# Patient Record
Sex: FEMALE | Race: WHITE
[De-identification: names, ages, dates, MRNs, and addresses within clinical notes are randomized per-mention and may not be internally consistent; named-entity substitution may affect disease eponyms.]

---

## 2019-01-08 ENCOUNTER — HOSPITAL ENCOUNTER (INPATIENT)
Dept: HOSPITAL 46 - ED | Age: 64
LOS: 2 days | Discharge: NURSING, REHAB OR LONG-TERM CARE | DRG: 291 | End: 2019-01-10
Attending: INTERNAL MEDICINE | Admitting: INTERNAL MEDICINE
Payer: MEDICARE

## 2019-01-08 VITALS — BODY MASS INDEX: 57.52 KG/M2 | WEIGHT: 293 LBS | HEIGHT: 60 IN

## 2019-01-08 DIAGNOSIS — K21.9: ICD-10-CM

## 2019-01-08 DIAGNOSIS — J96.22: ICD-10-CM

## 2019-01-08 DIAGNOSIS — Z66: ICD-10-CM

## 2019-01-08 DIAGNOSIS — E11.9: ICD-10-CM

## 2019-01-08 DIAGNOSIS — J44.9: ICD-10-CM

## 2019-01-08 DIAGNOSIS — I11.0: Primary | ICD-10-CM

## 2019-01-08 DIAGNOSIS — E78.5: ICD-10-CM

## 2019-01-08 DIAGNOSIS — F39: ICD-10-CM

## 2019-01-08 DIAGNOSIS — K59.00: ICD-10-CM

## 2019-01-08 DIAGNOSIS — E66.01: ICD-10-CM

## 2019-01-08 DIAGNOSIS — I50.33: ICD-10-CM

## 2019-01-08 DIAGNOSIS — J96.21: ICD-10-CM

## 2019-01-08 DIAGNOSIS — Z79.4: ICD-10-CM

## 2019-01-08 NOTE — NUR
IS SLEEPING SOUNDLY, NOTED SATS DECREASED TO [address]. WATCHED
PT SLEEP AND TV WILL BE 'S THEN PT WILL TAKE DEEP BREATH AND TV WILL INC
'S AND SATS INC. RT CALLED.

## 2019-01-08 NOTE — NUR
AWAKE, STATES CHEST WAS HURTING AND TAKING BIPAP OFF. CHEST DISCOMFORT
RESOVLED AFTER BIPAP OFF. FELL BACK TO SLEEP BEFORE STAFF LEFT ROOM. IS OFF
BIPAP FOR NOW.

## 2019-01-08 NOTE — NUR
63 year old female pateint ADMITTED TO CCU FOR ED UNDER DR. HAIDER WITH DX OF
END STAGE COPD/CHF/RESP FAILURE. UPON ADMIT PT IS AWARE OF PERSON, PLACE AND
TIME. WILL DRIFT OFF TO SLEEP EASILY THEN WAKE. IS ABLE TO ANSWER MOST
QUESTIONS. ADMISSION PROCESS STATED.

## 2019-01-08 NOTE — NUR
PT HAD MED FIRM FORMED STOOL ON BED PAN. CHAO WELL. RESP ARE UNLABORED. PT IS
ALERT AND INTERACTIVE.

## 2019-01-08 NOTE — NUR
WAS ON BEDPAN, NO BM BUT IS PASSING GAS. HAS BEEN ON BIPAP NOW FOR ABOUT
20MIN. PT TOLD SHE NEEDS TO WEAR IT AS MUCH AS POSSABLE TONIGHT.

## 2019-01-09 NOTE — NUR
PT UP TO CHAIR WITH ASUNCION AND MULTIPLE STAFF MEMBERS. PT TOELRATED WELL. WILL
CONTINUE TO CLOSELY MONITOR. PT BED CHANGED TO BARIATIC BED. LUNCH ORDERED. NO
OTHER ISSUES AT THIS TIME. WILL CONTINUE TO CLOSELY MONITOR.

## 2019-01-09 NOTE — NUR
ASSESSMENT DONE. MEDICATION GIVEN (SEE MAR). pt DENIES SOB AT THIS TIME. ON 4
L O2 VIA NC. LAYING IN BED WATCHING TV. CALL LIGHT WITHIN REACH. HIV/blood type/group B strep/rubella/VDRL/RPR/antibody screen/HBsAG

## 2019-01-09 NOTE — NUR
STATES NOT SLEEPING AND "I USUALLY TAKE A SLEEPING PILL." EXPLAINED WOULD NOT
BE GIVEN ANYTHING TO SLEEP AS POSSIBLITY OF RESP DEPRESSION. PT VOLUNTERED TO
GO BACK ON BIPAP AS "I MIGHT GO BACK TO SLEEP" BIPAP IN PLACE.

## 2019-01-09 NOTE — NUR
PT HAS HAD MULTIPLE LARGE BMS TODAY. PT IS BACK IN BED AT THIS TIME. PT DENIES
ANY NEEDS. CALL LIGHT IN REACH. TV ON. PT IS LOOKING AT THE MENU FOR DINNER.
WILL CONTINUE TO CLSOELY MONITOR.

## 2019-01-09 NOTE — NUR
PT REPOSITIONED IN THE CHAIR WITH ASUNCION LIFT AND MULTIPLE STAFF MEMBERS. PT
TOLERATED WELL. WILL CONTINUE TO CLOSELY MONITOR.

## 2019-01-09 NOTE — NUR
PT SHIFT REPORT RECEIVED FROM NIGHT SHIFT RN. PT RESTING IN BED AT THIS TIME.
WILL CONTINUE TO CLOSELY MONITOR.

## 2019-01-09 NOTE — NUR
PATIENT IN BED WATCHING TV. VITAL SIGNS AND I&O DONE. CALL LIGHT WITHIN REACH.
NO OTHER NEEDS AT THIS TIME

## 2019-01-09 NOTE — NUR
PT BACK TO BED WITH ASUNCION AND MULTIPLE STAFF MEMBERS. ONCE IN BED PT
REQUESTING BEDPAN. BED PLAN PLACED. WILL CONTINUE TO CLOSELY MONITOR. PT CALLS
APPROPRIATELY.

## 2019-01-09 NOTE — NUR
REPORT GIVEN TO [facility] EYAD AGUILERA. UPDATED REGUARDING PTS STATUS. PT REMAINS
ON 4L NC AT THIS ITME. PT IS ALERT AND ORIENTED AND HAS BEEN UP TO THE CHAIR
TODAY AND TOLERATED WELL. PARRA IS DRAINING CLEAR YELLOW URINE. PT BREATH
SOUNDS ARE CLEAR AND DIMINISHED AT THIS TIME. PT DINNER ORDERED. MEDICATIONS
ADMINISTERED. PT DENIES ANY NEEDS BEFORE TRANSFER. EYAD AGUILERA WILL RESUME
CARE. PT IS AGREEABLE TO THIS PLAN OF CARE.

## 2019-01-09 NOTE — NUR
ROUNDED AS CHARGE. ASSISTED DIOGO LUND WITH ASSISTING PATIENT OFF OF BED PAN.
PATIENT WAS ABLE TO HAVE A BM. ZENA CARE COMPLETED. ASHELY PROVIDED PT WITH
WARM BLANKET. NO FURTHER NEEDS CALL LIGHT IN REACH.

## 2019-01-09 NOTE — NUR
PT ON THE BEDPAN SEVERAL TIMES THIS AM. PT STATES SHE FEELS LIKE SHE HAS TO
HAVE A BM, BUT IT WONT COME OUT. MD IS AWARE. WILL TRY AND GET PT UP TO THE
CAMMODE. PT IS UNABLE TO MOVE SO WILL HAVE TO ASUNCION PATIENT TO CAMMODE.

## 2019-01-09 NOTE — NUR
MEDICATED WITH TYLENOL 650MG PO C/O H/A AND R HIP PAIN. COOPERATIVE WITH
ASSESSMENT. O2 4LNC, CHRONIC 2-4L AT HOME. NO C/O SOB, BIPAP AT BEDSIDE AT
THIS TIME, PT HAS A NEWLY DX SLEEP APNEA. IN BARIATRIC BED. PT IS A ASUNCION LIFT
AND DAILY WEIGHT LAST WEIGHT 319#. PT STATES SHE USES BSC. LEGS WITH OLD
STASIS ULCERS IN DIFFERENT STAGES OF HEALING. HAS AN IV FIELD START TO BE
CHANGED. PT AWARE. TOLERATING FLUIDS AND DIET WELL.

## 2019-01-09 NOTE — NUR
pt ARRIVED FROM CCU. ON 4 L VIA NC CHRONICLY. DAILY WEIGHT. BLOOD SUGAR CHECKS
ACHS. MULTIPLE LARGE BMS. PARRA CATHETER. IV SL. TOLERATING ADA DIET. SLEEP
APNEA. HAS NOT USED CALL LIGHT.

## 2019-01-10 NOTE — NUR
PATIENT CALLED AND REQUEST TO BE TAKEN OF BIPAP. ENCOURAGED AND EDUCATED
PATIENT ON THE IMPORTANCE OF WEARING IT. PATIENT VERBALIZES UNDERSTANDING AND
STILL WISHES TO BE TAKEN OFF OF IT. NO FURTHER NEEDS NOTED. CALL LIGHT IN
REACH.

## 2019-01-10 NOTE — NUR
REPORT RECEIVED THIS AM FROM KATHRINE CASTANO, PATIENT IS SITTING UP IN A BARIATRIC
BED WITH RAILS UP AND CALL LIGHT WITHIN REACH, BED IS IN THE LOW POSITION.
SHE HAS NO QUESTIONS OR CONCERNS AT THIS TIME AND IS ALERT AND ORIENTED.
PATIENT PLACED ON A BED PAN BY CNA'S X2.

## 2019-01-10 NOTE — NUR
PT HAS SLEPT THIS SHIFT. CURRENTLY AWAKE, WATCHING TV. NO C/O PAIN OR SOB. O2
4LNC, USED BIPAP FOR SEVERAL HOURS THIS SHIFT. PT IN BARIATRIC BED. DAILY
WEIGHT 333.1#. PT IS A ASUNCION LIFT. USED BEDPAN AND HAD A LARGE SOFT BM THIS
SHIFT. F/C PATENT. PT HELPS WITH TURNING. NO N/V

## 2019-01-10 NOTE — NUR
DOCTOR MELIZA IN TO SEE THE PATIENT AT THIS TIME, PATIENT PARRA REMOVED AT THIS
TIME, SHE WAS MOVED UP IN BED AND PO K GIVEN NOW.

## 2019-01-10 NOTE — NUR
patient remains at rest in bed with cover over her eyes, blood sugar check at
this time 93 no insulin coverage needed.

## 2019-01-10 NOTE — EKG
[facility]
                                    [address]
                                  [place]  [address]
_________________________________________________________________________________________
                                                                 Signed   
 
 
Normal sinus rhythm
Low voltage QRS
Borderline ECG
When compared with ECG of 20-NOV-2018 20:47,
No significant change was found
Confirmed by SHIRA HAIDER MD ([id number]) on 1/10/2019 12:51:51 AM
 
 
 
 
 
 
 
 
 
 
 
 
 
 
 
 
 
 
 
 
 
 
 
 
 
 
 
 
 
 
 
 
 
 
 
 
 
 
 
    Electronically Signed By: SHIRA HAIDER MD  01/10/19 0052
_________________________________________________________________________________________
PATIENT NAME:     RAHUL CORDOBA                     
MEDICAL RECORD #: [id number]                     Electrocardiogram             
          ACCT #: [id number]  
DATE OF BIRTH:   [birth year]                                       
PHYSICIAN:   SHIRA HAIDER MD           REPORT #: [id number]
REPORT IS CONFIDENTIAL AND NOT TO BE RELEASED WITHOUT AUTHORIZATION

## 2019-01-20 ENCOUNTER — HOSPITAL ENCOUNTER (EMERGENCY)
Dept: HOSPITAL 46 - ED | Age: 64
Discharge: HOME | End: 2019-01-20
Payer: MEDICARE

## 2019-01-20 VITALS — HEIGHT: 60 IN | BODY MASS INDEX: 57.52 KG/M2 | WEIGHT: 293 LBS

## 2019-01-20 DIAGNOSIS — Z88.1: ICD-10-CM

## 2019-01-20 DIAGNOSIS — Z91.048: ICD-10-CM

## 2019-01-20 DIAGNOSIS — J44.9: ICD-10-CM

## 2019-01-20 DIAGNOSIS — Z87.891: ICD-10-CM

## 2019-01-20 DIAGNOSIS — Z79.899: ICD-10-CM

## 2019-01-20 DIAGNOSIS — Z88.2: ICD-10-CM

## 2019-01-20 DIAGNOSIS — R07.2: Primary | ICD-10-CM

## 2019-01-20 DIAGNOSIS — Z88.5: ICD-10-CM

## 2019-01-20 DIAGNOSIS — F32.9: ICD-10-CM

## 2019-01-20 DIAGNOSIS — E78.5: ICD-10-CM

## 2019-01-20 NOTE — XMS
PreManage Notification: RAHUL CORDOBA MRN:[id number]
 
Security Information
 
Security Events
No recent Security Events currently on file
 
 
 
CRITERIA MET
------------
- [facility] - 2 Visits in 30 Days
 
 
CARE PROVIDERS
-------------------------------------------------------------------------------------
Lizette Mcmahon     /Care Coordinator     01/01/2015-Current
 
PHONE: [phone]
-------------------------------------------------------------------------------------
Lizette Mcmahon     Primary Care     01/01/2015-Current
 
PHONE: [phone]
-------------------------------------------------------------------------------------
 
Francis has no Care Guidelines for this patient.
 
JENNIE VISIT COUNT (12 MO.)
-------------------------------------------------------------------------------------
[address] [place] [address]
-------------------------------------------------------------------------------------
TOTAL 3
-------------------------------------------------------------------------------------
NOTE: Visits indicate total known visits.
 
ED/UCC VISIT TRACKING (12 MO.)
-------------------------------------------------------------------------------------
01/20/2019 15:20
[facility]
 
TYPE: Emergency
 
COMPLAINT:
- CHEST PAIN
-------------------------------------------------------------------------------------
01/08/2019 09:11
[facility]
 
TYPE: Emergency
 
COMPLAINT:
- WEAKNESS/DIZZY
-------------------------------------------------------------------------------------
11/20/2018 20:02
[facility]
 
TYPE: Emergency
 
COMPLAINT:
 
- SHORTNESS OF BREATH
-------------------------------------------------------------------------------------
 
 
INPATIENT VISIT TRACKING (12 MO.)
-------------------------------------------------------------------------------------
01/08/2019 12:54
[facility]
 
TYPE: Medical Surgical
 
COMPLAINT:
- RESPIRATORY FAILURE
 
DIAGNOSES:
- Unspecified mood [affective] disorder
- Long term (current) use of insulin
- Gastro-esophageal reflux disease without esophagitis
- Do not resuscitate
- Morbid (severe) obesity due to excess calories
- Type 2 diabetes mellitus without complications
- Hypertensive heart disease with heart failure
- Acute on chronic diastolic (congestive) heart failure
- Constipation, unspecified
- Long term (current) use of insulin
- Chronic obstructive pulmonary disease, unspecified
- Gastro-esophageal reflux disease without esophagitis
- Constipation, unspecified
- Do not resuscitate
- Acute and chronic respiratory failure with hypercapnia
- Acute on chronic diastolic (congestive) heart failure
- Morbid (severe) obesity due to excess calories
- Chronic obstructive pulmonary disease, unspecified
- Unspecified mood [affective] disorder
- Acute and chronic respiratory failure with hypercapnia
- Acute and chronic respiratory failure with hypoxia
- Type 2 diabetes mellitus without complications
- Hyperlipidemia, unspecified
- Hyperlipidemia, unspecified
- Hypertensive heart disease with heart failure
-------------------------------------------------------------------------------------
11/20/2018 22:23
[facility]
 
TYPE: Medical Surgical
 
COMPLAINT:
- CONGESTIVE HEART FAILURE
 
DIAGNOSES:
- Long term (current) use of oral hypoglycemic drugs
- Pneumonia due to Streptococcus pneumoniae
- Acute and chronic respiratory failure with hypercapnia
- Unspecified mood [affective] disorder
- Long term (current) use of oral hypoglycemic drugs
- Hypertensive heart disease with heart failure
- Acute and chronic respiratory failure with hypoxia
- Unspecified Escherichia coli [E. coli] as the cause of diseases classified
elsewhere
- Hypertensive heart disease with heart failure
 
- Chronic obstructive pulmonary disease with acute lower respiratory infection
- Acute and chronic respiratory failure with hypercapnia
- Unspecified mood [affective] disorder
- Do not resuscitate
- Pneumonia due to Streptococcus pneumoniae
- Unspecified Escherichia coli [E. coli] as the cause of diseases classified
elsewhere
- Urinary tract infection, site not specified
- Obstructive sleep apnea (adult) (pediatric)
- Obstructive sleep apnea (adult) (pediatric)
- Gastro-esophageal reflux disease without esophagitis
- Urinary tract infection, site not specified
- Gastro-esophageal reflux disease without esophagitis
- Acute and chronic respiratory failure with hypoxia
- Dependence on supplemental oxygen
- Do not resuscitate
- Acute on chronic diastolic (congestive) heart failure
- Chronic obstructive pulmonary disease with acute lower respiratory infection
- Hyperlipidemia, unspecified
- Type 2 diabetes mellitus without complications
- Long term (current) use of insulin
- Chronic obstructive pulmonary disease, unspecified
- Dependence on supplemental oxygen
- Hyperlipidemia, unspecified
- Type 2 diabetes mellitus without complications
-------------------------------------------------------------------------------------
 
[email or web address]

## 2019-01-21 NOTE — EKG
[facility]
                                    [address]
                                  [place]  [address]
_________________________________________________________________________________________
                                                                 Signed   
 
 
Normal sinus rhythm with sinus arrhythmia
Low voltage QRS
Borderline ECG
When compared with ECG of 08-JAN-2019 09:30,
No significant change was found
Confirmed by SHIRA BATES DO ([id number]) on 1/21/2019 12:17:03 PM
 
 
 
 
 
 
 
 
 
 
 
 
 
 
 
 
 
 
 
 
 
 
 
 
 
 
 
 
 
 
 
 
 
 
 
 
 
 
 
    Electronically Signed By: SHIRA BATES DO  01/21/19 1217
_________________________________________________________________________________________
PATIENT NAME:     RAHUL CORDOBA                     
MEDICAL RECORD #: [id number]                     Electrocardiogram             
          ACCT #: [id number]  
DATE OF BIRTH:   [birth year]                                       
PHYSICIAN:   SHIRA BATES DO                     REPORT #: [id number]
REPORT IS CONFIDENTIAL AND NOT TO BE RELEASED WITHOUT AUTHORIZATION

## 2019-01-21 NOTE — EKG
[facility]
                                    [address]
                                  [place], [place]  [address]
_________________________________________________________________________________________
                                                                 Signed   
 
 
Normal sinus rhythm
Low voltage QRS
Borderline ECG
When compared with ECG of 20-JAN-2019 15:26, (Unconfirmed)
No significant change was found
Confirmed by SHIRA BATES DO ([id number]) on 1/21/2019 12:17:09 PM
 
 
 
 
 
 
 
 
 
 
 
 
 
 
 
 
 
 
 
 
 
 
 
 
 
 
 
 
 
 
 
 
 
 
 
 
 
 
 
    Electronically Signed By: SHIRA BATES DO  01/21/19 1217
_________________________________________________________________________________________
PATIENT NAME:     RAHUL CORDOBA                     
MEDICAL RECORD #: [id number]                     Electrocardiogram             
          ACCT #: [id number]  
DATE OF BIRTH:   [birth year]                                       
PHYSICIAN:   SHIRA BATES DO                     REPORT #: [id number]
REPORT IS CONFIDENTIAL AND NOT TO BE RELEASED WITHOUT AUTHORIZATION

## 2019-01-27 ENCOUNTER — HOSPITAL ENCOUNTER (OUTPATIENT)
Dept: HOSPITAL 46 - ED | Age: 64
Setting detail: OBSERVATION
LOS: 1 days | Discharge: NURSING, REHAB OR LONG-TERM CARE | End: 2019-01-28
Attending: INTERNAL MEDICINE | Admitting: INTERNAL MEDICINE
Payer: MEDICARE

## 2019-01-27 VITALS — BODY MASS INDEX: 57.52 KG/M2 | HEIGHT: 60 IN | WEIGHT: 293 LBS

## 2019-01-27 DIAGNOSIS — F43.10: ICD-10-CM

## 2019-01-27 DIAGNOSIS — Z79.2: ICD-10-CM

## 2019-01-27 DIAGNOSIS — R60.1: ICD-10-CM

## 2019-01-27 DIAGNOSIS — F51.04: ICD-10-CM

## 2019-01-27 DIAGNOSIS — F25.9: ICD-10-CM

## 2019-01-27 DIAGNOSIS — Z79.899: ICD-10-CM

## 2019-01-27 DIAGNOSIS — E11.51: ICD-10-CM

## 2019-01-27 DIAGNOSIS — Z79.891: ICD-10-CM

## 2019-01-27 DIAGNOSIS — E78.5: ICD-10-CM

## 2019-01-27 DIAGNOSIS — Z79.51: ICD-10-CM

## 2019-01-27 DIAGNOSIS — J30.9: ICD-10-CM

## 2019-01-27 DIAGNOSIS — K21.9: ICD-10-CM

## 2019-01-27 DIAGNOSIS — J96.22: ICD-10-CM

## 2019-01-27 DIAGNOSIS — Z79.82: ICD-10-CM

## 2019-01-27 DIAGNOSIS — F41.8: ICD-10-CM

## 2019-01-27 DIAGNOSIS — E66.2: ICD-10-CM

## 2019-01-27 DIAGNOSIS — Z88.5: ICD-10-CM

## 2019-01-27 DIAGNOSIS — Z88.1: ICD-10-CM

## 2019-01-27 DIAGNOSIS — Z88.2: ICD-10-CM

## 2019-01-27 DIAGNOSIS — Z66: ICD-10-CM

## 2019-01-27 DIAGNOSIS — I50.32: ICD-10-CM

## 2019-01-27 DIAGNOSIS — Z79.1: ICD-10-CM

## 2019-01-27 DIAGNOSIS — J96.21: Primary | ICD-10-CM

## 2019-01-27 DIAGNOSIS — G89.4: ICD-10-CM

## 2019-01-27 DIAGNOSIS — Z87.440: ICD-10-CM

## 2019-01-27 DIAGNOSIS — I11.0: ICD-10-CM

## 2019-01-27 DIAGNOSIS — J44.9: ICD-10-CM

## 2019-01-27 DIAGNOSIS — Z79.84: ICD-10-CM

## 2019-01-27 DIAGNOSIS — M19.011: ICD-10-CM

## 2019-01-27 DIAGNOSIS — Z88.3: ICD-10-CM

## 2019-01-27 PROCEDURE — C9113 INJ PANTOPRAZOLE SODIUM, VIA: HCPCS

## 2019-01-27 PROCEDURE — G0378 HOSPITAL OBSERVATION PER HR: HCPCS

## 2019-01-27 NOTE — NUR
PT AWAKE, RESTING IN BED, AOX4, PT REQUESTING TO BE OFF OF BIPAP BREIFLY, PT
ON OXY MASK AT 8L, O2 SAT 90%, RR 19, NO C/O SOB/CP. NO REQUESTS AT THIS TIME,
CALL LIGHT WITHIN REACH.

## 2019-01-27 NOTE — NUR
PT RESTING IN BED, EYES CLOSED, BREATHS EVEN, UNLABORED, ON BIPAP, FIO2: 50%,
O2 SAT 90%, RR 16, HR 71, NO REQUESTS AT THIS TIME, NO SIGNS OF DISCOMFORT OR
RESTLESNESS, IV FLUIDS INFUSING PER EMAR WNL, PARRA CATH DRAINING WNL, CALL
LIGHT WITHIN REACH.

## 2019-01-27 NOTE — NUR
PT BACK ON BIPAP, D5 LR STARTED AT 75ML/HR, PT AWAKE, RESTING IN BED, AOX4, O2
SAT 89%, RR, 21, FIO2 50%, NO C/O SOB/CP. NO REQUESTS AT THIS TIME. CALL LIGHT
WITHIN REACH.

## 2019-01-27 NOTE — NUR
PT TO THE CCU FROM ER, RESP THERAPY WITH PT TRANSPORT TO ASSIST WITH BIPAP. PT
IS TOTAL LIFT FROM GURNEY TO BED, USED OVERHEAD LIFT WITH PT'S OWN SLING FROM
HOME, 3 PERSON ASSIST TO TRANSFER. PT DROWSY.

## 2019-01-27 NOTE — NUR
PT RESTING IN BED, AOX4, PT COMMUNICATING WITH STAFF, ON BIPAP, O2 SAT 90%,
FIO2: 50%, RR 19, HR 70'S VSS. ASSESSMENT COMPLETE, LS CLEAR/DIMINISHED, PT
DENIES SOB/CP. PULSES STRONG, REDDENED SCALY AREA NOTED ON PT'S RIGHT JIANG,
PT STATES THAT SHE HAS SOME CHRONIC NUMBNESS IN LEFT FOOT, CMS OTHERWISE
INTACT. PT REPOSITIONED IN BED FOR COMFORT. CBG 78, DISCUSSED WITH EYAD ANDERSON,
EYAD ANDERSON TO DISCUSS WITH MD. WILL CONTINUE TO MONITOR. CALL LIGHT WITHIN REACH.
FALL PRECAUTIONS IN PLACE. PARRA CATH DRAINING WNL.

## 2019-01-27 NOTE — NUR
BIPAP BACK IN PLACE PER PT REQUEST. PT ALERT AND ORIENTED X4 AT THIS TIME. PT
LAYING IN BED WITH HOB ELEVATED LISTENING TO MUSIC. PT APPEARS CALM, CALL
LIGHT WITHIN REACH, PT ABLE TO USE CALL LIGHT APPRORIATLY.

## 2019-01-27 NOTE — NUR
PARRA CATH PLACED AFTER EXTENSIVE ZENA CARE DONE. PT ARRIVED TO THE CCU
INCONTINENT OF LARGE AMOUNT OF URINE, SMEAR OF STOOL. NOTED SMALL AMOUNT OF
WHITE VAGINAL DISCHARGE. 4 PEOPLE REQUIRED TO PLACE PARRA DUE TO PT OBESITY.

## 2019-01-27 NOTE — NUR
PT AWAKE, AOX4, RESTING IN BED, PT STATES THAT SHE WOULD LIKE TO BE OFF OF
BIPAP FOR A BRIEF PERIOD. PT ON OXY MASK AT 8L, O2 SAT 90%, RR 20, NO C/O SOB,
HR 72, NO REQUESTS AT THIS TIME, CALL LIGHT WITHIN REACH. IV FLUIDS INFUSING
PER EMAR WNL, PT REPOSITIONED FOR COMFORT, PARRA CATH DRAINING WNL.

## 2019-01-28 ENCOUNTER — HOSPITAL ENCOUNTER (OUTPATIENT)
Dept: HOSPITAL 46 - MS | Age: 64
Setting detail: OBSERVATION
LOS: 2 days | Discharge: NURSING, REHAB OR LONG-TERM CARE | End: 2019-01-30
Attending: INTERNAL MEDICINE | Admitting: INTERNAL MEDICINE
Payer: MEDICARE

## 2019-01-28 VITALS — HEIGHT: 60 IN | BODY MASS INDEX: 57.52 KG/M2 | WEIGHT: 293 LBS

## 2019-01-28 DIAGNOSIS — K21.9: ICD-10-CM

## 2019-01-28 DIAGNOSIS — E66.2: ICD-10-CM

## 2019-01-28 DIAGNOSIS — F39: ICD-10-CM

## 2019-01-28 DIAGNOSIS — J96.22: ICD-10-CM

## 2019-01-28 DIAGNOSIS — Z66: ICD-10-CM

## 2019-01-28 DIAGNOSIS — Z88.2: ICD-10-CM

## 2019-01-28 DIAGNOSIS — Z88.5: ICD-10-CM

## 2019-01-28 DIAGNOSIS — E11.9: ICD-10-CM

## 2019-01-28 DIAGNOSIS — Z79.891: ICD-10-CM

## 2019-01-28 DIAGNOSIS — J44.9: ICD-10-CM

## 2019-01-28 DIAGNOSIS — J96.21: Primary | ICD-10-CM

## 2019-01-28 DIAGNOSIS — Z79.51: ICD-10-CM

## 2019-01-28 DIAGNOSIS — Z79.899: ICD-10-CM

## 2019-01-28 DIAGNOSIS — Z79.1: ICD-10-CM

## 2019-01-28 DIAGNOSIS — Z88.3: ICD-10-CM

## 2019-01-28 DIAGNOSIS — Z79.82: ICD-10-CM

## 2019-01-28 DIAGNOSIS — E78.5: ICD-10-CM

## 2019-01-28 DIAGNOSIS — F51.04: ICD-10-CM

## 2019-01-28 DIAGNOSIS — G89.4: ICD-10-CM

## 2019-01-28 DIAGNOSIS — I50.32: ICD-10-CM

## 2019-01-28 DIAGNOSIS — Z79.84: ICD-10-CM

## 2019-01-28 PROCEDURE — 5A09357 ASSISTANCE WITH RESPIRATORY VENTILATION, LESS THAN 24 CONSECUTIVE HOURS, CONTINUOUS POSITIVE AIRWAY PRESSURE: ICD-10-PCS | Performed by: INTERNAL MEDICINE

## 2019-01-28 PROCEDURE — G0378 HOSPITAL OBSERVATION PER HR: HCPCS

## 2019-01-28 NOTE — NUR
PT RESTING; NO CONCERNS OR PAIN. NO CHANGE IN RESPIRATORY STATUS. CALL LIGHT
WITHIN REACH. FALL PRECAUTIONS IN PLACE. WILL CONTINUE TO MONITOR.

## 2019-01-28 NOTE — NUR
ASSESSMENT COMPLETED. PT STILL DENIES PAIN. NO N/V. SWITCHING TO AND FROM
OXYMASK AND BIPAP AS NEEDED. PARRA DRAINING ADEQUATE URINE. NO REQUESTS,
QUESTIONS OR CONCERNS AT THIS TIME. CALL LIGHT WITHIN REACH. FALL PRECAUTIONS
IN PLACE. WILL CONTINUE TO MONITOR.

## 2019-01-28 NOTE — NUR
PT AWAKE, RT IN ROOM, PT'S MOUTH SWABBED PER PT'S REQUEST, PT TAKING A BREAK
FROM BIPAP WHILE RT IN ROOM ADMINISTERING NEB TREATMENT. PT ON OXY MASK AT 8L,
O2 SAT 90%, RR 20, NO REQUESTS AT THIS TIME, RT REMAINS IN ROOM, CALL LIGHT
WITHIN REACH.

## 2019-01-28 NOTE — NUR
PLAN AT THIS TIME IS FOR THE PT TO BE DC FROM HOSPITAL AT [facility] AND
[facility] AND TO GO TO THE SLEEP LAB FROM THERE AS SHE WAS SCHEDULED FOR SLEEP
STUDY PRIOR TO OBS ADMIT TO THE HOSPITAL.  SHE IS TO HAVE HER STUDY AND THEN
RETURN TO [facility] IN THE AM. [facility] WILL PICK HER UP IN THE AM.

## 2019-01-28 NOTE — NUR
PT AWAKE, TAKING A BREAK FROM THE BIPAP, ON OXY MASK AT 10 L, O2 SAT 88%, RR
20, PT GIVEN ORAL SWAB FOR COMFORT, NO C/O SOB/CP, NO C/O PAIN, NO REQUESTS AT
THIS TIME, CALL LIGHT WITHIN REACH.

## 2019-01-28 NOTE — NUR
PT RESTING IN BED, ON BIPAP, FIO2 50%, O2 SAT 90%, NO REQUESTS AT THIS TIME,
CALL LIGHT WITHIN REACH. IV FLUIDS INFUSING PER EMAR.

## 2019-01-28 NOTE — NUR
ASSESSMENT COMPLETED. PT WIDE AWAKE, ORIENTED X4. 10 L/MIN OF O2 VIA OXYMASK
IN PLACE. PT REQUIRES FREQUENT EDUCATION ON BIPAP/ GENERAL RESPIRATORY CARE.
PT NEEDS REINFORCEMENT ON ALL EDUCATION, NOT GRASPING THE SEVERITY OF HER NEED
FOR BIPAP LONG TERM. LUNGS CLEAR/DIM. PARRA IN PLACE AND WNL; DRAINING
ADEQUATE URINE. PT STATES SHE HAS NO PAIN, NO N/V. AWAITING PT BREAKFAST.
DENIES ALL OTHER NEEDS AT THIS TIME. CALL LIGHT WITHIN REACH. FALL PRECAUTIONS
IN PLACE. WILL CONTINUE TO MONITOR.

## 2019-01-28 NOTE — NUR
PT IS AWAKE BUT DROWSY LAYING IN BED ON BIPAP AT THIS TIME. PT DENIES ANY
PAIN AND STATES THAT SHE'S COMFORTABLE. PT ASKING ABOUT BREAKFAST AND I WILL
FOLLOW UP ON THIS WITH DR. RUTHERFORD. CALL LIGHT WITHIN REACH. NO CHANGE IN BIPAP
SETTINGS. FALL PRECAUTIONS IN PLACE. WILL CONTINUE TO MONITOR.

## 2019-01-28 NOTE — NUR
PT PLACED BACK ON BIPAP, FIO2: 50%, O2 SAT 90%, HR 72, NO REQUESTS AT THIS
TIME, CALL LIGHT WITHIN REACH. IV FLUIDS INFUSING PER EMAR WNL.

## 2019-01-28 NOTE — NUR
RED ALARMS STARTED GOING OFF FOR PT, ALARMING VERY LOW O2 SATS- THIS RN
ENTERED ROOM TO SEE PT EATING HER DINNER ON ROOM AIR. EDUCATION GIVEN ON
IMPORTANCE OF PT NOT REMOVING HER OXYGEN SUPPLY TO EAT HER FOOD- THAT SHE MUST
CALL FOR ASSISTANCE TO SWITCH TO O2 VIA NC BEFORE SHE CAN EAT. SHE STATES HER
UNDERSTANDING BUT SHE REQUIRES MUCH REINFORCEMENT. HAD PT STOP EATING, DEEP
BREATHE IN THROUGH NOSE OUT THROUGH MOUTH WITH O2 ON; O2 SATS STABILIZED AND
SHE IS NOW WORKING ON DINNER. CALL LIGHT WITHIN REACH. FALL PRECAUTIONS IN
PLACE. WILL CONTINUE TO MONITOR.

## 2019-01-28 NOTE — NUR
PT PLACED ON 6 L/MIN O2 VIA NC WHILE EATING BREAKFAST- SATURATIONS DROPPED TO
MID 70'S; REINFORCEMENT ON DEEP BREATHING WHILE EATING/EATING SLOWLY AS TO NOT
WORK UP HER EXERTION. 10 L/MIN O2 VIA OXYMASK AFTER BREAKFAST WHILE VISITING
WITH HER COUSIN AT THE BEDSIDE. PT DENIES ANY NEEDS TO BE MET; JUST REQUESTS
TO BE KEPT UP TO DATE ON PLAN OF CARE. CALL LIGHT WITHIN REACH. FALL
PRECAUTIONS IN PLACE. WILL CONTINUE TO MONITOR.

## 2019-01-28 NOTE — NUR
THIS RN CALLED AND GAVE TELEPHONE REPORT TO RECEIVING RN SHAHAB AT
[facility] WHO WILL BE TAKING PT IN THE MORNING WHEN SHE ARRIVES FROM HER
OVERNIGHT SLEEP STUDY. SHAHAB IS AWARE OF PLAN OF CARE, INCLUDING REMOVAL OF
PARRA IN THE MORNING UPON HER ARRIVAL TO [facility].

## 2019-01-28 NOTE — EKG
[facility]
                                    [address]
                                  [place]  [address]
_________________________________________________________________________________________
                                                                 Signed   
 
 
Normal sinus rhythm
Low voltage QRS
Borderline ECG
When compared with ECG of 20-JAN-2019 16:27,
No significant change was found
Confirmed by MELINDA RUTHERFORD MD ([id number]) on 1/28/2019 7:55:02 AM
 
 
 
 
 
 
 
 
 
 
 
 
 
 
 
 
 
 
 
 
 
 
 
 
 
 
 
 
 
 
 
 
 
 
 
 
 
 
 
    Electronically Signed By: MELINDA RUTHERFORD MD  01/28/19 0755
_________________________________________________________________________________________
PATIENT NAME:     RAHUL CORDOBA                     
MEDICAL RECORD #: [id number]                     Electrocardiogram             
          ACCT #: [id number]  
DATE OF BIRTH:   [birth year]                                       
PHYSICIAN:   MELINDA RUTHERFORD MD                   REPORT #: [id number]
REPORT IS CONFIDENTIAL AND NOT TO BE RELEASED WITHOUT AUTHORIZATION

## 2019-01-28 NOTE — NUR
pt ARRIVED VIA WHEELCHAIR FROM SLEEP STUDY. ASUNCION TO BED. ON 8 L O2 VIA NC.
CPAP SETUP AND AT BEDSIDE. ASSESSMENT DONE. PERICARE PROVIDED. MEDICATIONS
GIVEN (SEE MAR). FLUID RESTRICTION EDUCATION DONE. CALL LIGHT WITHIN REACH. NO
FURTHER REQUESTS AT THIS TIME.

## 2019-01-28 NOTE — NUR
PT PLACED BACK ON BIPAP AND IS RESTING IN BED. NO QUESTIONS OR CONCERNS AT
THIS TIME. PT IS MAINTAINING 89-90% O2 SAT WITH BIPAP IN PLACE. NO QUESTIONS
OR CONCERNS. CALL LIGHT WITHIN REACH. WILL CONTINUE TO MONITOR.

## 2019-01-28 NOTE — NUR
PT RESTING IN BED, ON BIPAP, FIO2 50%, O2 SAT 90%, RR 19, HR 69, NO REQUESTS
AT THIS TIME, CALL LIGHT WITHIN REACH. IV FLUIDS INFUSING PER EMAR WNL. PARRA
CATH DRAINING WNL.

## 2019-01-28 NOTE — NUR
PT RESTING IN BED ON 10 L/MIN O2 VIA OXYMASK. PT REMAINS PLEASANT AND CALM
WITH NO QUESTIONS, CONCERNS OR COMPLAINTS. PT HAS BEEN UPDATED ON PLAN OF CARE
AND IS AWARE OF HER TRANSFER FOR SLEEP STUDY THIS EVENING. PARRA IN PLACE AND
WNL. CALL LIGHT WITHIN REACH. FALL PRECAUTIONS IN PLACE. WILL CONTINUE TO
MONITOR.

## 2019-01-28 NOTE — NUR
ASSESSMENT COMPLETED. NO CHANGE TO ASSESSMENT. PT ON 10 L/MIN O2 VIA OXYMASK.
NO C/O PAIN. CALL LIGHT WITHIN REACH. WILL CONTINUE TO MONITOR.

## 2019-01-28 NOTE — NUR
ASSESSMENT COMPLETE, PT RESTING IN BED, AOX4, ON BIPAP, FIO2: 50%, O2 SAT 89%,
RR 18, NO C/O SOB, NO C/O PAIN. PT REPOSITIONED IN BED FOR COMFORT. PARRA CARE
DONE, WHITE VAGINAL DISCHARGE NOTED, PT'S ATTENDS DRY. PARRA CATH DRAINING
WNL. PT DENIES ANY NEEDS AT THIS TIME. PT MORE ALERT THAN PREVIOUS, FREQUENTLY
TALKING WITH STAFF, EDUCATION PROVIDED REGARDING O2 THERAPY AND MEDICATIONS,
AS WELL AS SAFETY. PT COMPLIANT WITH POC. NO REQUESTS AT THIS TIME, CALL LIGHT
WITHIN REACH. FALL PRECAUTIONS IN PLACE.

## 2019-01-29 NOTE — NUR
PATIENT GIVEN SCHEDULED MEDICATION AT THIS TIME, SHE STATES THAT SHE FEELS
BETTER AT THIS TIME.  SHE % OF HER LUNCH AND DENIES OTHER NEEDS AT THIS
TIME

## 2019-01-29 NOTE — NUR
HAVING BEEN TALKING TO [facility] AND [facility] TO GET THE PT SET UP FOR BIPAP
AT [facility].  ORDERS WERE SENT TO IN [facility] AND TO [facility] FOR THE BIPAP.  [facility] WAS SENDING SOMEONE UP TO FIT A MASK TO THE PT.  HOWEVER THEY STATED THAT
THIS AFTERNOON THEY WERE TO BUSY TO COME AND FIT PT FOR THE MASK, THEREFORE
THEY WOULD BE UP TOMORROW TO FIT THE MASK AND TAKE IT TO [facility].

## 2019-01-29 NOTE — NUR
PATIENT ACCIDENTLY PULLED HER IV OUT IN THE LEFT HAND TIP INTACT NEW IV IN THE
RIGHT HAND STARTED AT THIS TIME #20 GUAGE.

## 2019-01-29 NOTE — NUR
REPOSITIONED PT AND REMOVED PARRA, PT FELT LIKE SOMETHING WAS UNDER HER.
ATTENDS ARE IN PLACE NOW. PT ON BIPAP AND DENIES FURTHER NEEDS. CALL LIGHT IS
CLOSE.

## 2019-01-29 NOTE — NUR
CPOX ALARMING. 84% ON 8L VIA OXY MASK WHILE SLEEPING. INCREASED O2 TO 12 L VIA
OXY MASK. SATING >87%. WILL CONTINUE TO MONITOR. pt REFUSED CPAP AT THIS TIME.

## 2019-01-29 NOTE — NUR
CALL LIGHT ON. pt REPORTED FEELING WARM. ADJUSTED BLANKETS AND THERMOSTAT.
REPOSITIONED CPAP. PROVIDED COOL CLOTH. CALL LIGHT WITH REACH. NO FURTHER
REQUESTS AT THIS TIME.

## 2019-01-29 NOTE — NUR
IN ROOM TO ASSESS PT AND ADMINISTER MEDICATIONS. PT DENIES PAIN AT THIS TIME.
RT PUT HER ON BIPAP. LUNGS SOUND DIM.  AND NO INSULIN REQUIRED. CALL
LIGHT IS CLOSE, SHE DENIES NEEDS.

## 2019-01-29 NOTE — NUR
CNA IN ROOM TO DO VITAL SIGNS. pt RESTING WITH CPAP ON. REPORTED 4/10 PAIN IN
SAME AREA AS EARLIER, ENCOURAGED REST AND RELAXATION. TOLERATING FLUID
RESTRICTION. ASSESSMENT DONE. RT CHECKED CPAP EQUIPMENT AND MASK FIT. NO OTHER
REQUESTS AT THIS TIME. CALL LIGHT WITHIN REACH.

## 2019-01-29 NOTE — NUR
pt RESTED ON AND OFF DURING SHIFT. pt ON 8L O2 VIA OXY MASK WHILE AWAKE. CPAP
WHILE SLEEPING. WHEN OFF CPAP TITRATED TO 12L OXY MASK. ASUNCION LIFT. WHEELCHAIR
BOUND. IV SL. ADA DIET. FLUID RESTRICTION, C/O BEING THIRSTY. CPOX.
ACCUCHECKS. TELE 5, SR. AIDA, LOW URINE OUTPUT PER PARAMETERS. USES CALL
LIGHT APPROPRIATELY.

## 2019-01-29 NOTE — NUR
PATIENT ZENA/PARRA CARE DONE AT THIS TIME, SHE WAS INCONTENT OF URINE, CHUX
CHANGED AND I AND O DONE.

## 2019-01-29 NOTE — NUR
MEDICATION GIVEN (SEE MAR). pt ON 12L O2 OXYMASK SAT OF 89%. CALLED RT WHO
REQUESTED THAT O2 BE TITRATED TO 8L. DONE. SAT 89%. WILL CONTINUE TO MONITOR
AND TITRATE ACCORDINGLY.

## 2019-01-29 NOTE — NUR
CALL LIGHT ON. REQUESTED TO CHANGE FROM CPAP TO OXY MASK. MASK APPLIED, 8L O2.
CALL LIGHT WITHIN REACH. NO FURTHER REQUESTS AT THIS TIME.

## 2019-01-29 NOTE — NUR
REPORT RECEIVED FROM YASHIRA CASTANO, PATIENT IS ON A OXYMASK CURRENTLY AT 8L, SHE
HAS NO C/O SOB AT THIS  TIME.  BED IS IN THE LOW POSITION AND THE SIDE RAILS
ARE UP.  SHE DID NOT USE THE CPAP MACHINE LAST NIGHT AS SHE DID NOT TOLERATE
THE MASK.  PATIENT IS ORIENTED AND ALERT AT THIS TIME.

## 2019-01-29 NOTE — NUR
CPOX ALARMING, CPAP ALARMING. pt REMOVED CPAP. REQUESTED OXY MASK. O2 SAT 88%.
CALL LIGHT WITHIN REACH. NO REQUESTS AT THIS TIME.

## 2019-01-30 NOTE — NUR
PT APPEARS TO BE RESTING, EYES ARE CLOSED,
RESPIRATIONS ARE EVEN AND UNLABORED. CALL LIGHT IS WITHIN REACH.

## 2019-01-30 NOTE — NUR
PT HAD BIPAP ON MOST OF THE NIGHT FROM ABOUT 10PM TO 6AM. CPOX IS IN PLACE AND
SHE IS ON 9L HIGH FLOW NC. AIDA WAS DC'D LAST NIGHT AND SHE HAS ATTENDS IN
PLACE. SHE IS ON ADA DIET WITH 1600ML FLUID RESTRICTION. IV IN RT HAND IS SL.
SHE IS ON TELE # 5 IN NSR. DAILY WEIGHT .2 DOWN FROM 331.9. SHE MAY DC
TODAY WITH CPAP.

## 2019-01-30 NOTE — NUR
PT IS RESTING WITH EYES CLOSED, RR EVEN AND NONLABORED ON BIPAP AND CPOX AT
92%. CALL LIGHT IS CLOSE.

## 2019-01-30 NOTE — NUR
PT IS IN BED VISTING WITH DAVID. PT HAS NO COMPLAINTS AT THIS TIME. CALL
LIGHT WITHIN REACH,
FALL PRECAUTIONS IN PLACE, BED IS LOW POSISTION AND LOCKED.

## 2019-01-30 NOTE — NUR
CHANGED PT'S ATTENDS, SHE DENIES FURTHER NEEDS AT THIS TIME. SHE IS ON 9L HIGH
FLOW NC. CALL LIGHT IS CLOSE.

## 2019-01-30 NOTE — NUR
PT IS RESTING WITH EYES CLOSED, RESPIRATIONS ARE EVEN AND NONLABORED. CALL
LIGHT IS WITHIN REACH. BIPAP AND CPOX INPLACE.

## 2019-01-30 NOTE — NUR
PATIENT SITTING UP IN BED EATING HER BREAKFAST.  SHE HAS NO C/O SOB CURRENTLY
AND REMAINS ON A HIGH FLOW NC AT 9L, O2 SATURATIONS CURRENTLY 90%.  PULSE OX
REMAINS ON.

## 2019-01-30 NOTE — NUR
PATIENT REPORT RECEIVED FROM ASHLEY CASTANO.  PATIENT SITTING UP IN BED ALERT AND
ORIENTED.  BED IS IN THE LOW POSITION WITH RAILS UP.  SHE HAS NO QUESTIONS OR
CONCERNS AT THIS TIME.

## 2019-01-30 NOTE — NUR
PT IS DISCHARGED BACK TO [facility]. VITALS ARE WNL, O2 IS ON 9 L
HIGHFLOW N/C. [facility] TRANSFER SHEET POSISTIONED UNDER PT. PT TRANSFERED TO
[facility] FOR TRANSFER BACK TO [facility] PER AMBULANCE.

## 2019-01-30 NOTE — NUR
PT IS OFF BIPAP AND IS NOW ON 9L HIGH FLOW NC, SATS ARE 89%, RESPIRATIONS ARE
EVEN AND UNLABORED. PT IS ALERT AND RESPONDS APPROPRIATELY TO QUESTIONS . CALL
LIGHT WITHIN REACH.

## 2019-01-30 NOTE — NUR
MEDICATIONS GIVEN, PT WAS ABLE TO TAKE MEDICATIONS INDEPENDANTLY WITH NO
COMPLICATIONS. CALL LIGHT WITHIN REACH, PT HAS NO OTHER NEEDS AT THIS TIME.

## 2019-01-30 NOTE — NUR
TALKED WITH IN HOME MED AND [facility] THIS AM REGARDING THE MASK FITTING AND WAS
TOLD SHE WOULD BE COMING UP THIS AM WITH THE MASK AND FIT THE PT.
AROUND 10 OR 11 SHE WAS HERE AND FITTED THE MASK AND TOOK TO [facility] TO SET THE
BIPAP.
CALLED EMS AND ARRANGED FOR A NON EMERGENT TRANSFER BACK TO [facility] AND THEY
STATED THE WOULD BE HERE AT 1300

## 2019-01-30 NOTE — NUR
PT IN BED APPEARS TO BE RESTING COMFORTABLEY, EYES CLOSED RESPIRATIONS ARE
EVEN AND UNLABORED WITH NO FACIAL GRIMACING. CALL LIGHT WITHIN REACH.

## 2019-01-30 NOTE — NUR
PT IS IN BED EATING LUNCH INDEPENDANTLY. NO COMPLAINTS OF BEING SOB,SATS ARE
91% ON 9 L HIGHFLOW N/C. PT DENIES ANY FURTHER NEEDS AT THIS TIME. CALL LIGHT
WITHIN REACH.

## 2020-10-30 NOTE — XMS
PreManage Notification: RAHUL CORDOBA MRN:[id number]
 
Security Information
 
Security Events
No recent Security Events currently on file
 
 
 
CRITERIA MET
------------
- PDMP
 
 
CARE PROVIDERS
-------------------------------------------------------------------------------------
Name Unknown     Skilled Nursing Facility     Current
 
PHONE: [phone]
-------------------------------------------------------------------------------------
Lizette Mcmahon     /Care Coordinator     [birth year]/01/2013-Current
 
PHONE: [phone]
-------------------------------------------------------------------------------------
[facility] CORE[facility]     Family Medicine: Sports Medicine     01/21/2019-Current
 
PHONE: Unknown
-------------------------------------------------------------------------------------
 
Francis has no Care Guidelines for this patient.
 
E.D. VISIT COUNT (12 MO.)
-------------------------------------------------------------------------------------
1 [facility]
-------------------------------------------------------------------------------------
TOTAL 1
-------------------------------------------------------------------------------------
NOTE: Visits indicate total known visits.
 
ED/UCC VISIT TRACKING (12 MO.)
-------------------------------------------------------------------------------------
10/30/2020 13:10
[facility]
 
TYPE: Emergency
 
COMPLAINT:
- SOB,FEVER
-------------------------------------------------------------------------------------
 
 
INPATIENT VISIT TRACKING (12 MO.)
No inpatient visits to display in this time frame
 
[email or web address]

## 2020-12-31 NOTE — XMS
PreManage Notification: RAHUL CORDOBA MRN:[id number]
 
Security Information
 
Security Events
No recent Security Events currently on file
 
 
 
CRITERIA MET
------------
- PDMP
 
 
CARE PROVIDERS
-------------------------------------------------------------------------------------
Name Unknown     Skilled Nursing Facility     Current
 
PHONE: [phone]
-------------------------------------------------------------------------------------
Lizette Mcmahon     /Care Coordinator     [birth year]/01/2013-Current
 
PHONE: [phone]
-------------------------------------------------------------------------------------
[facility]     Family Medicine: Sports Medicine     01/21/2019-Current
 
PHONE: Unknown
-------------------------------------------------------------------------------------
 
Francis has no Care Guidelines for this patient.
 
E.D. VISIT COUNT (12 MO.)
-------------------------------------------------------------------------------------
2 [facility] Collins
-------------------------------------------------------------------------------------
TOTAL 2
-------------------------------------------------------------------------------------
NOTE: Visits indicate total known visits.
 
ED/UCC VISIT TRACKING (12 MO.)
-------------------------------------------------------------------------------------
12/31/2020 14:38
[facility]
 
TYPE: Emergency
 
COMPLAINT:
- LEG PAIN NON INJURY
-------------------------------------------------------------------------------------
10/30/2020 13:10
[facility]
 
TYPE: Emergency
 
COMPLAINT:
- SOB,FEVER
 
DIAGNOSES:
- Allergy status to narcotic agent
 
- Other long term (current) drug therapy
- Major depressive disorder, single episode, unspecified
- Post-traumatic stress disorder, unspecified
- Gastro-esophageal reflux disease without esophagitis
- Sleep apnea, unspecified
- Obesity, unspecified
- Other nonmedicinal substance allergy status
- Hypertensive heart disease with heart failure
- Chronic obstructive pulmonary disease, unspecified
- Long term (current) use of oral hypoglycemic drugs
- Long term (current) use of aspirin
- Anxiety disorder, unspecified
- Morbid (severe) obesity with alveolar hypoventilation
- Allergy status to other drugs, medicaments and biological substances
- Morbid (severe) obesity with alveolar hypoventilation
- Personal history of nicotine dependence
- Allergy status to sulfonamides
- Heart failure, unspecified
- Schizoaffective disorder, unspecified
- Insomnia, unspecified
- Allergy status to other antibiotic agents
- Type 2 diabetes mellitus without complications
-------------------------------------------------------------------------------------
 
 
INPATIENT VISIT TRACKING (12 MO.)
No inpatient visits to display in this time frame
 
[email or web address]

## 2022-02-14 NOTE — XMS
PreManage Notification: RAHUL CORDOBA MRN:[id number]
 
Security Information
 
Security Events
No recent Security Events currently on file
 
 
 
CRITERIA MET
------------
- ED - Positive COVID-19 Lab Result - [facility]
- PDMP
 
 
CARE PROVIDERS
-------------------------------------------------------------------------------------
JAY QUIÑONEZ             Podiatrist     Current
[facility]
 
PHONE: [phone]
-------------------------------------------------------------------------------------
MARK DELGADO      Internal Medicine     Current
 
PHONE: Unknown
-------------------------------------------------------------------------------------
Lizette Mcmahon     /Care Coordinator     01/01/2022-Current
 
PHONE: [phone]
-------------------------------------------------------------------------------------
CHERYL ESCALANTE     Nurse Practitioner     Current
 
PHONE: [phone]
-------------------------------------------------------------------------------------
CHINO CASANOVA      Nurse Practitioner: Family     Current
 
PHONE: Unknown
-------------------------------------------------------------------------------------
KELVIN WALKER     Internal Medicine     Current
 
PHONE: [phone]
-------------------------------------------------------------------------------------
PEPPER MARTINEZ     Nurse Practitioner     Current
 
PHONE: [phone]
-------------------------------------------------------------------------------------
MESSI DUARTE     Physician Assistant     Current
 
PHONE: Unknown
-------------------------------------------------------------------------------------
MADHAV SINGH     Family Medicine: Sports Medicine     01/21/2019-Current
 
PHONE: Unknown
-------------------------------------------------------------------------------------
NICHOLAS         Nurse Loretta LOMELI
 
PHONE: [phone]
-------------------------------------------------------------------------------------
 
KAREN [facility]     Current
 
PHONE: Unknown
-------------------------------------------------------------------------------------
TELMA [facility]     Current
 
PHONE: [phone]
-------------------------------------------------------------------------------------
MOR SCHMIDT      Physician Assistant     Current
 
PHONE: Unknown
-------------------------------------------------------------------------------------
 
Francis has no Care Guidelines for this patient.
 
E.DReyna VISIT COUNT (12 MO.)
-------------------------------------------------------------------------------------
1 RAFITA Collins
-------------------------------------------------------------------------------------
TOTAL 1
-------------------------------------------------------------------------------------
NOTE: Visits indicate total known visits.
 
ED/UCC VISIT TRACKING (12 MO.)
-------------------------------------------------------------------------------------
02/14/2022 18:57
[facility]
 
TYPE: Emergency
 
COMPLAINT:
- ALTERED MENTAL STATUS
-------------------------------------------------------------------------------------
 
 
INPATIENT VISIT TRACKING (12 MO.)
No inpatient visits to display in this time frame
 
[email or web address]

## 2024-05-31 NOTE — XMS
PreManage Notification: RAHUL CORDOBA MRN:[id number]
 
Security Information
 
Security Events
No recent Security Events currently on file
 
 
 
CRITERIA MET
------------
- [facility] - 2 Visits in 30 Days
 
 
CARE PROVIDERS
-------------------------------------------------------------------------------------
Lizette Mcmahon     /Care Coordinator     01/01/2013-Current
 
PHONE: [phone]
-------------------------------------------------------------------------------------
OLIVIER COREY     [facility] Medicine: Sports Medicine     01/21/2019-Current
 
PHONE: Unknown
-------------------------------------------------------------------------------------
Lizette Mcmahon     Primary Care     01/01/2015-Current
 
PHONE: [phone]
-------------------------------------------------------------------------------------
 
Francis has no Care Guidelines for this patient.
 
E.D. VISIT COUNT (12 MO.)
-------------------------------------------------------------------------------------
4 RAFITA Collins
-------------------------------------------------------------------------------------
TOTAL 4
-------------------------------------------------------------------------------------
NOTE: Visits indicate total known visits.
 
ED/UCC VISIT TRACKING (12 MO.)
-------------------------------------------------------------------------------------
01/27/2019 12:48
[facility]
 
TYPE: Emergency
 
COMPLAINT:
- CHEST PAIN
-------------------------------------------------------------------------------------
01/20/2019 15:20
[facility]
 
TYPE: Emergency
 
COMPLAINT:
- CHEST PAIN
 
DIAGNOSES:
- Chronic obstructive pulmonary disease, unspecified
 
- Allergy status to narcotic agent status
- Other nonmedicinal substance allergy status
- Hyperlipidemia, unspecified
- Other long term (current) drug therapy
- Allergy status to other antibiotic agents status
- Precordial pain
- Allergy status to sulfonamides status
- Major depressive disorder, single episode, unspecified
- Personal history of nicotine dependence
-------------------------------------------------------------------------------------
01/08/2019 09:11
[facility]
 
TYPE: Emergency
 
COMPLAINT:
- WEAKNESS/DIZZY
-------------------------------------------------------------------------------------
11/20/2018 20:02
[facility]
 
TYPE: Emergency
 
COMPLAINT:
- SHORTNESS OF BREATH
-------------------------------------------------------------------------------------
 
 
INPATIENT VISIT TRACKING (12 MO.)
-------------------------------------------------------------------------------------
01/08/2019 12:54
[facility]
 
TYPE: Medical Surgical
 
COMPLAINT:
- RESPIRATORY FAILURE
 
DIAGNOSES:
- Unspecified mood [affective] disorder
- Long term (current) use of insulin
- Gastro-esophageal reflux disease without esophagitis
- Do not resuscitate
- Morbid (severe) obesity due to excess calories
- Type 2 diabetes mellitus without complications
- Hypertensive heart disease with heart failure
- Acute on chronic diastolic (congestive) heart failure
- Constipation, unspecified
- Long term (current) use of insulin
- Chronic obstructive pulmonary disease, unspecified
- Gastro-esophageal reflux disease without esophagitis
- Constipation, unspecified
- Do not resuscitate
- Acute and chronic respiratory failure with hypercapnia
- Acute on chronic diastolic (congestive) heart failure
- Morbid (severe) obesity due to excess calories
- Chronic obstructive pulmonary disease, unspecified
- Unspecified mood [affective] disorder
- Acute and chronic respiratory failure with hypercapnia
- Acute and chronic respiratory failure with hypoxia
 
- Type 2 diabetes mellitus without complications
- Hyperlipidemia, unspecified
- Hyperlipidemia, unspecified
- Hypertensive heart disease with heart failure
-------------------------------------------------------------------------------------
11/20/2018 22:23
[facility] OR
 
TYPE: Medical Surgical
 
COMPLAINT:
- CONGESTIVE HEART FAILURE
 
DIAGNOSES:
- Long term (current) use of oral hypoglycemic drugs
- Pneumonia due to Streptococcus pneumoniae
- Acute and chronic respiratory failure with hypercapnia
- Unspecified mood [affective] disorder
- Long term (current) use of oral hypoglycemic drugs
- Hypertensive heart disease with heart failure
- Acute and chronic respiratory failure with hypoxia
- Unspecified Escherichia coli [E. coli] as the cause of diseases classified
elsewhere
- Hypertensive heart disease with heart failure
- Chronic obstructive pulmonary disease with acute lower respiratory infection
- Acute and chronic respiratory failure with hypercapnia
- Unspecified mood [affective] disorder
- Do not resuscitate
- Pneumonia due to Streptococcus pneumoniae
- Unspecified Escherichia coli [E. coli] as the cause of diseases classified
elsewhere
- Urinary tract infection, site not specified
- Obstructive sleep apnea (adult) (pediatric)
- Obstructive sleep apnea (adult) (pediatric)
- Gastro-esophageal reflux disease without esophagitis
- Urinary tract infection, site not specified
- Gastro-esophageal reflux disease without esophagitis
- Acute and chronic respiratory failure with hypoxia
- Dependence on supplemental oxygen
- Do not resuscitate
- Acute on chronic diastolic (congestive) heart failure
- Chronic obstructive pulmonary disease with acute lower respiratory infection
- Hyperlipidemia, unspecified
- Type 2 diabetes mellitus without complications
- Long term (current) use of insulin
- Chronic obstructive pulmonary disease, unspecified
- Dependence on supplemental oxygen
- Hyperlipidemia, unspecified
- Type 2 diabetes mellitus without complications
-------------------------------------------------------------------------------------
 
[email or web address]